# Patient Record
Sex: FEMALE | Race: WHITE | NOT HISPANIC OR LATINO | Employment: FULL TIME | URBAN - METROPOLITAN AREA
[De-identification: names, ages, dates, MRNs, and addresses within clinical notes are randomized per-mention and may not be internally consistent; named-entity substitution may affect disease eponyms.]

---

## 2018-12-28 ENCOUNTER — HOSPITAL ENCOUNTER (OUTPATIENT)
Dept: FAMILY MEDICINE CLINIC | Facility: CLINIC | Age: 23
Setting detail: SPECIMEN
Discharge: HOME | End: 2018-12-28
Attending: FAMILY MEDICINE | Admitting: FAMILY MEDICINE

## 2018-12-28 LAB — ERYTHROCYTE [SEDIMENTATION RATE] IN BLOOD BY WESTERGREN METHOD: 7 MM/HR (ref 0–20)

## 2018-12-31 LAB
CHROMATIN AB SERPL-ACNC: <20 [IU]/ML (ref 0–20)
EBV AB TO VIRAL CAPSID AG, IGM: NEGATIVE

## 2019-05-09 ENCOUNTER — HOSPITAL ENCOUNTER (OUTPATIENT)
Dept: FAMILY MEDICINE CLINIC | Facility: CLINIC | Age: 24
Setting detail: SPECIMEN
Discharge: HOME | End: 2019-05-09
Attending: FAMILY MEDICINE | Admitting: FAMILY MEDICINE

## 2019-05-09 LAB
BASOPHILS # BLD AUTO: 0 10*3/UL (ref 0–0.2)
BASOPHILS NFR BLD AUTO: 1 % (ref 0–2)
DIFFERENTIAL METHOD BLD: (no result)
EOSINOPHIL # BLD AUTO: 0.1 10*3/UL (ref 0–0.3)
EOSINOPHIL # BLD AUTO: 2 % (ref 0–3)
ERYTHROCYTE [DISTWIDTH] IN BLOOD BY AUTOMATED COUNT: 12.8 % (ref 11.5–14.5)
HCT VFR BLD AUTO: 41.9 % (ref 35–49)
HGB BLD-MCNC: 13.9 G/DL (ref 12–15)
LYMPHOCYTES # BLD AUTO: 2.4 10*3/UL (ref 0.8–4.8)
LYMPHOCYTES NFR BLD AUTO: 39 % (ref 18–42)
MCH RBC QN AUTO: 31.4 PG (ref 26–32)
MCHC RBC AUTO-ENTMCNC: 33.3 G/DL (ref 32–36)
MCV RBC AUTO: 94.5 FL (ref 80–94)
MONOCYTES # BLD AUTO: 0.4 10*3/UL (ref 0.1–1.3)
MONOCYTES NFR BLD AUTO: 7 % (ref 2–11)
NEUTROPHILS # BLD AUTO: 3.2 10*3/UL (ref 2.3–8.6)
NEUTROPHILS NFR BLD AUTO: 51 % (ref 50–75)
NRBC BLD AUTO-RTO: 0 /100{WBCS}
NRBC/RBC NFR BLD MANUAL: 0 10*3/UL
PLATELET # BLD AUTO: 256 10*3/UL (ref 150–450)
PMV BLD AUTO: 8.9 FL (ref 7.4–10.4)
RBC # BLD AUTO: 4.44 10*6/UL (ref 4–5.4)
TSH SERPL-ACNC: 1.34 UIU/ML (ref 0.34–5.6)
WBC # BLD AUTO: 6.2 10*3/UL (ref 4.5–11.5)

## 2019-05-10 LAB — 25(OH)D3 SERPL-MCNC: 18 NG/ML (ref 30–100)

## 2019-08-07 ENCOUNTER — OFFICE VISIT (OUTPATIENT)
Dept: FAMILY MEDICINE CLINIC | Facility: CLINIC | Age: 24
End: 2019-08-07

## 2019-08-07 VITALS
HEART RATE: 88 BPM | BODY MASS INDEX: 23.56 KG/M2 | OXYGEN SATURATION: 100 % | DIASTOLIC BLOOD PRESSURE: 77 MMHG | HEIGHT: 60 IN | WEIGHT: 120 LBS | SYSTOLIC BLOOD PRESSURE: 109 MMHG

## 2019-08-07 DIAGNOSIS — M25.562 ARTHRALGIA OF BOTH KNEES: Primary | ICD-10-CM

## 2019-08-07 DIAGNOSIS — M25.561 ARTHRALGIA OF BOTH KNEES: Primary | ICD-10-CM

## 2019-08-07 PROBLEM — R76.8 ELEVATED ANTINUCLEAR ANTIBODY (ANA) LEVEL: Status: ACTIVE | Noted: 2019-01-04

## 2019-08-07 PROBLEM — R51.9 HEADACHE: Status: ACTIVE | Noted: 2019-05-09

## 2019-08-07 PROBLEM — F32.A DEPRESSION: Status: ACTIVE | Noted: 2019-05-09

## 2019-08-07 PROBLEM — E55.9 VITAMIN D DEFICIENCY: Status: ACTIVE | Noted: 2019-05-10

## 2019-08-07 PROBLEM — R20.2 PARESTHESIAS: Status: ACTIVE | Noted: 2019-05-09

## 2019-08-07 PROBLEM — M25.50 PAIN IN JOINT: Status: ACTIVE | Noted: 2018-12-28

## 2019-08-07 PROCEDURE — 99213 OFFICE O/P EST LOW 20 MIN: CPT | Performed by: NURSE PRACTITIONER

## 2019-08-07 RX ORDER — BACLOFEN 10 MG/1
10 TABLET ORAL 3 TIMES DAILY
Qty: 30 TABLET | Refills: 0 | Status: SHIPPED | OUTPATIENT
Start: 2019-08-07 | End: 2022-05-25

## 2019-08-07 RX ORDER — NAPROXEN 500 MG/1
500 TABLET ORAL 2 TIMES DAILY WITH MEALS
Qty: 60 TABLET | Refills: 1 | Status: SHIPPED | OUTPATIENT
Start: 2019-08-07 | End: 2022-05-25

## 2019-08-07 RX ORDER — BUTALBITAL, ACETAMINOPHEN AND CAFFEINE 50; 325; 40 MG/1; MG/1; MG/1
CAPSULE ORAL
Refills: 0 | COMMUNITY
Start: 2019-06-01 | End: 2022-05-25

## 2019-08-07 RX ORDER — ERGOCALCIFEROL 1.25 MG/1
CAPSULE ORAL
COMMUNITY
Start: 2019-05-10 | End: 2022-05-25

## 2019-08-07 NOTE — PATIENT INSTRUCTIONS
Start taking naproxen twice daily  May take two extra strength tylenol up to 3 times a day (dont exceed 3,000mg)  Continue warm compress and showers  Call Rheumatologist to have them fax records  May take baclofen for muscle spasms

## 2019-08-07 NOTE — PROGRESS NOTES
Subjective   Arlen Roth is a 24 y.o. female.     Pt is here today with c/o knee pain and stiffness.  She states that she went to stand up and she could hardly put any pressure on her left leg.  She has had a high BEN screening and has seen a rheumatologist when she was living in [place].  She is trying to get established with one here.  She has consistently been having pain all over her body,  But it tends to be worsening.  She has tried taking some aleve and ibuprofen without relief. Relieving: soaking in hot bath with epsom salt.  Aggravating Factors: up walking and movement. She does still have some numbness and tingling in her hands and feet, but she is to see a neurologist next week.  Denies fever or chills.  Increased fatigue. She was on mobic, but didn't see any improvement so she stopped it,         The following portions of the patient's history were reviewed and updated as appropriate: allergies, current medications, past family history, past medical history, past social history, past surgical history and problem list.    Review of Systems   Constitutional: Positive for fatigue. Negative for appetite change, chills and fever.   Eyes: Positive for blurred vision (occasional). Negative for double vision, pain, discharge, itching and visual disturbance.   Respiratory: Positive for shortness of breath (occasional). Negative for cough, chest tightness and wheezing.    Cardiovascular: Negative for chest pain and palpitations.   Gastrointestinal: Negative for abdominal pain, blood in stool, constipation, diarrhea, nausea and vomiting.   Endocrine: Negative for polydipsia, polyphagia and polyuria.   Genitourinary: Negative for dysuria, flank pain, frequency and urgency.   Musculoskeletal: Positive for arthralgias, joint swelling and myalgias (knees).   Skin: Negative for rash and skin lesions.   Neurological: Positive for numbness and headache. Negative for dizziness and weakness. Light-headedness: stable at this  "time.   Psychiatric/Behavioral: Negative for stress. The patient is nervous/anxious.        Objective   /77 (BP Location: Right arm, Patient Position: Sitting, Cuff Size: Adult)   Pulse 88   Ht 152.4 cm (60\")   Wt 54.4 kg (120 lb)   SpO2 100%   BMI 23.44 kg/m²   Physical Exam   Constitutional: She is oriented to person, place, and time. She appears well-developed and well-nourished.   HENT:   Head: Normocephalic and atraumatic.   Eyes: EOM are normal. Pupils are equal, round, and reactive to light.   Cardiovascular: Normal rate and regular rhythm.   Pulmonary/Chest: Effort normal and breath sounds normal.   Abdominal: Soft. Bowel sounds are normal.   Musculoskeletal: She exhibits tenderness (tenderness around entire knee joint.  No crepitus or popping). She exhibits no edema or deformity.   Pain in knee associated with bending and straightening leg   Neurological: She is alert and oriented to person, place, and time.   Skin: Skin is warm and dry.   Psychiatric: She has a normal mood and affect. Her behavior is normal. Thought content normal.         Assessment/Plan   Problems Addressed this Visit        Nervous and Auditory    Pain in joint - Primary    Relevant Medications    naproxen (NAPROSYN) 500 MG tablet    baclofen (LIORESAL) 10 MG tablet              Diagnoses and all orders for this visit:    1. Arthralgia of both knees (Primary)  Comments:  pt waiting to see rheum- advised to have rheum in [place] fax their records  failed mobic  try naproxen and Extra strenth tylenol  baclofen as needed  Orders:  -     naproxen (NAPROSYN) 500 MG tablet; Take 1 tablet by mouth 2 (Two) Times a Day With Meals.  Dispense: 60 tablet; Refill: 1  -     baclofen (LIORESAL) 10 MG tablet; Take 1 tablet by mouth 3 (Three) Times a Day.  Dispense: 30 tablet; Refill: 0          "

## 2019-12-12 ENCOUNTER — TELEPHONE (OUTPATIENT)
Dept: FAMILY MEDICINE CLINIC | Facility: CLINIC | Age: 24
End: 2019-12-12

## 2019-12-12 RX ORDER — AZITHROMYCIN 250 MG/1
TABLET, FILM COATED ORAL
Qty: 6 TABLET | Refills: 0 | Status: SHIPPED | OUTPATIENT
Start: 2019-12-12 | End: 2022-05-25

## 2022-05-25 ENCOUNTER — LAB (OUTPATIENT)
Dept: FAMILY MEDICINE CLINIC | Facility: CLINIC | Age: 27
End: 2022-05-25

## 2022-05-25 ENCOUNTER — OFFICE VISIT (OUTPATIENT)
Dept: FAMILY MEDICINE CLINIC | Facility: CLINIC | Age: 27
End: 2022-05-25

## 2022-05-25 VITALS
DIASTOLIC BLOOD PRESSURE: 87 MMHG | HEART RATE: 89 BPM | HEIGHT: 60 IN | WEIGHT: 125 LBS | SYSTOLIC BLOOD PRESSURE: 124 MMHG | TEMPERATURE: 98.7 F | BODY MASS INDEX: 24.54 KG/M2 | OXYGEN SATURATION: 100 %

## 2022-05-25 DIAGNOSIS — N91.2 AMENORRHEA: ICD-10-CM

## 2022-05-25 DIAGNOSIS — N91.2 AMENORRHEA: Primary | ICD-10-CM

## 2022-05-25 LAB
ALBUMIN SERPL-MCNC: 4.4 G/DL (ref 3.5–5.2)
ALBUMIN/GLOB SERPL: 1.8 G/DL
ALP SERPL-CCNC: 61 U/L (ref 39–117)
ALT SERPL W P-5'-P-CCNC: 30 U/L (ref 1–33)
ANION GAP SERPL CALCULATED.3IONS-SCNC: 13.9 MMOL/L (ref 5–15)
AST SERPL-CCNC: 32 U/L (ref 1–32)
BASOPHILS # BLD AUTO: 0.08 10*3/MM3 (ref 0–0.2)
BASOPHILS NFR BLD AUTO: 1.1 % (ref 0–1.5)
BILIRUB SERPL-MCNC: 0.2 MG/DL (ref 0–1.2)
BUN SERPL-MCNC: 10 MG/DL (ref 6–20)
BUN/CREAT SERPL: 14.5 (ref 7–25)
CALCIUM SPEC-SCNC: 9.1 MG/DL (ref 8.6–10.5)
CHLORIDE SERPL-SCNC: 100 MMOL/L (ref 98–107)
CO2 SERPL-SCNC: 26.1 MMOL/L (ref 22–29)
CREAT SERPL-MCNC: 0.69 MG/DL (ref 0.57–1)
DEPRECATED RDW RBC AUTO: 39.8 FL (ref 37–54)
EGFRCR SERPLBLD CKD-EPI 2021: 122.9 ML/MIN/1.73
EOSINOPHIL # BLD AUTO: 0.32 10*3/MM3 (ref 0–0.4)
EOSINOPHIL NFR BLD AUTO: 4.4 % (ref 0.3–6.2)
ERYTHROCYTE [DISTWIDTH] IN BLOOD BY AUTOMATED COUNT: 12.1 % (ref 12.3–15.4)
FSH SERPL-ACNC: 4.63 MIU/ML
GLOBULIN UR ELPH-MCNC: 2.5 GM/DL
GLUCOSE SERPL-MCNC: 98 MG/DL (ref 65–99)
HCT VFR BLD AUTO: 39.1 % (ref 34–46.6)
HGB BLD-MCNC: 13.3 G/DL (ref 12–15.9)
IMM GRANULOCYTES # BLD AUTO: 0.01 10*3/MM3 (ref 0–0.05)
IMM GRANULOCYTES NFR BLD AUTO: 0.1 % (ref 0–0.5)
LH SERPL-ACNC: 30.7 MIU/ML
LYMPHOCYTES # BLD AUTO: 3.64 10*3/MM3 (ref 0.7–3.1)
LYMPHOCYTES NFR BLD AUTO: 50.5 % (ref 19.6–45.3)
MCH RBC QN AUTO: 30.9 PG (ref 26.6–33)
MCHC RBC AUTO-ENTMCNC: 34 G/DL (ref 31.5–35.7)
MCV RBC AUTO: 90.9 FL (ref 79–97)
MONOCYTES # BLD AUTO: 0.6 10*3/MM3 (ref 0.1–0.9)
MONOCYTES NFR BLD AUTO: 8.3 % (ref 5–12)
NEUTROPHILS NFR BLD AUTO: 2.56 10*3/MM3 (ref 1.7–7)
NEUTROPHILS NFR BLD AUTO: 35.6 % (ref 42.7–76)
NRBC BLD AUTO-RTO: 0 /100 WBC (ref 0–0.2)
PLATELET # BLD AUTO: 257 10*3/MM3 (ref 140–450)
PMV BLD AUTO: 10.9 FL (ref 6–12)
POTASSIUM SERPL-SCNC: 4.1 MMOL/L (ref 3.5–5.2)
PROT SERPL-MCNC: 6.9 G/DL (ref 6–8.5)
RBC # BLD AUTO: 4.3 10*6/MM3 (ref 3.77–5.28)
SODIUM SERPL-SCNC: 140 MMOL/L (ref 136–145)
TSH SERPL DL<=0.05 MIU/L-ACNC: 3.6 UIU/ML (ref 0.27–4.2)
WBC NRBC COR # BLD: 7.21 10*3/MM3 (ref 3.4–10.8)

## 2022-05-25 PROCEDURE — 83001 ASSAY OF GONADOTROPIN (FSH): CPT | Performed by: FAMILY MEDICINE

## 2022-05-25 PROCEDURE — 82672 ASSAY OF ESTROGEN: CPT | Performed by: FAMILY MEDICINE

## 2022-05-25 PROCEDURE — 83002 ASSAY OF GONADOTROPIN (LH): CPT | Performed by: FAMILY MEDICINE

## 2022-05-25 PROCEDURE — 36415 COLL VENOUS BLD VENIPUNCTURE: CPT

## 2022-05-25 PROCEDURE — 84702 CHORIONIC GONADOTROPIN TEST: CPT | Performed by: FAMILY MEDICINE

## 2022-05-25 PROCEDURE — 80050 GENERAL HEALTH PANEL: CPT | Performed by: FAMILY MEDICINE

## 2022-05-25 PROCEDURE — 99213 OFFICE O/P EST LOW 20 MIN: CPT | Performed by: FAMILY MEDICINE

## 2022-05-25 NOTE — PROGRESS NOTES
"Subjective   Arlen Roth is a 27 y.o. female.     History of Present Illness     Arlen Marin, a 27-year-old female comes in today with complaints of amenorrhea since 03/2022. She has done a pregnancy test, both urine and blood that were negative.    The patient reports she has not had a menstrual cycle since mid-03/2022. She states she is sexually active and uses condoms. The patient reports that within the last 2 weeks she had two negative pregnancy tests; a blood test and a urine test done at urgent care. She states she has a history of ovarian cysts. The patient reports she started on a birth control patch a couple of years ago, and stopped using it about 09/2022. She states that the patch caused her to gain weight, she felt irritated all the time, and caused skin break-outs.    The patient reports she started a new job in 03/2022, and cannot identify other stressors. She denies pelvic pain. She states she has gained 17 pounds recently. The patient denies changing her exercise routine.    The following portions of the patient's history were reviewed and updated as appropriate: allergies, current medications, past family history, past medical history, past social history, past surgical history, and problem list.  Past Medical History:   Diagnosis Date   • Headache      Past Surgical History:   Procedure Laterality Date   • ADENOIDECTOMY     • TONSILLECTOMY       Family History   Problem Relation Age of Onset   • Diabetes Mother    • Hypertension Mother    • Diabetes Father    • Hypertension Father    • Cancer Maternal Grandmother      Social History     Socioeconomic History   • Marital status: Single   Tobacco Use   • Smoking status: Never Smoker   • Smokeless tobacco: Never Used   Vaping Use   • Vaping Use: Never used   Substance and Sexual Activity   • Alcohol use: No   • Drug use: No   • Sexual activity: Never       No current outpatient medications on file.    Review of Systems  Ht 152.4 cm (60\")   Wt 56.7 kg " (125 lb)   BMI 24.41 kg/m²     Review of systems was performed, and pertinent findings are noted in the HPI.    Objective   Physical Exam  Vitals and nursing note reviewed.   Constitutional:       General: She is not in acute distress.     Appearance: Normal appearance. She is well-developed.      Comments: She is normal. Well groomed, cooperative exam.    HENT:      Head: Normocephalic and atraumatic.   Neck:      Thyroid: No thyromegaly.      Comments: Neck supple without adenopathy. Thyroid is normal to palpation.  Cardiovascular:      Rate and Rhythm: Normal rate and regular rhythm.      Heart sounds: Normal heart sounds. No murmur heard.    No friction rub. No gallop.      Comments: Heart has regular rate and rhythm without murmur.   Pulmonary:      Effort: Pulmonary effort is normal. No respiratory distress.      Breath sounds: Normal breath sounds. No wheezing or rales.      Comments: Lungs are clear to auscultation bilaterally.   Abdominal:      Comments: Abdomen is soft, nontender. Positive bowel sounds. No guarding, no rebound. No masses appreciated.   Musculoskeletal:      Cervical back: Neck supple.   Lymphadenopathy:      Cervical: No cervical adenopathy.   Skin:     General: Skin is warm and dry.      Comments: No abnormal skin rashes, bruising, jaundice.    Neurological:      Mental Status: She is alert.   Psychiatric:         Mood and Affect: Mood normal.           Assessment & Plan   Problems Addressed this Visit    None     Visit Diagnoses     Amenorrhea    -  Primary      Diagnoses       Codes Comments    Amenorrhea    -  Primary ICD-10-CM: N91.2  ICD-9-CM: 626.0         1. Amenorrhea  - We will check hormone levels and thyroid levels. I will go ahead and check a serum hCG, FSH, LH, total estrogen, CBC, CMP, TSH. At this point, I do not feel like she needs a pelvic ultrasound since she has not been having any pelvic pain. I have encouraged her to work on some stress reduction. There has been no  change in her exercise routine, so I have explained to her that this may be a normal occurrence and that her regular period will come along in the next month or so.  Her weight has not changed significantly in our office in the last year    Transcribed from ambient dictation for Alesha Fernandes MD by Deonna Barnhart.  05/25/22   09:24 EDT    Patient verbalized consent to the visit recording.

## 2022-05-26 LAB — HCG INTACT+B SERPL-ACNC: <1 MIU/ML

## 2022-05-28 LAB — ESTROGEN SERPL-MCNC: 123 PG/ML

## 2022-06-27 DIAGNOSIS — E22.1 HYPERPROLACTINEMIA: ICD-10-CM

## 2022-06-27 DIAGNOSIS — N91.2 AMENORRHEA: Primary | ICD-10-CM

## 2022-06-29 ENCOUNTER — LAB (OUTPATIENT)
Dept: FAMILY MEDICINE CLINIC | Facility: CLINIC | Age: 27
End: 2022-06-29

## 2022-06-29 DIAGNOSIS — N91.2 AMENORRHEA: ICD-10-CM

## 2022-06-29 DIAGNOSIS — E22.1 HYPERPROLACTINEMIA: ICD-10-CM

## 2022-06-29 LAB — PROLACTIN SERPL-MCNC: 24.2 NG/ML (ref 4.79–23.3)

## 2022-06-29 PROCEDURE — 36415 COLL VENOUS BLD VENIPUNCTURE: CPT

## 2022-06-29 PROCEDURE — 84146 ASSAY OF PROLACTIN: CPT | Performed by: FAMILY MEDICINE

## 2022-07-01 ENCOUNTER — TELEPHONE (OUTPATIENT)
Dept: FAMILY MEDICINE CLINIC | Facility: CLINIC | Age: 27
End: 2022-07-01

## 2022-07-01 NOTE — TELEPHONE ENCOUNTER
PATIENT IS CALLING IN SHE IS ASKING TO HAVE THE ORDERS OR REFERRAL FOR MRI SENT OVER TO [facility] IN [place] INSTEAD.      [address] [facility] [place] [address] [phone] IS THE NUMBER FOR THE FACILITY.   PLEASE ADVISE    CALLBACK NUMBER IS  [phone]

## 2022-07-06 ENCOUNTER — TELEPHONE (OUTPATIENT)
Dept: FAMILY MEDICINE CLINIC | Facility: CLINIC | Age: 27
End: 2022-07-06

## 2022-07-06 NOTE — TELEPHONE ENCOUNTER
PATIENT IS WANTING AN UPDATE ON THE REFERRAL FOR XRAY     CAN YOU CALL WITH MORE INFORMATION       Arlen Roth (Self) [phone] (H)

## 2022-07-07 NOTE — TELEPHONE ENCOUNTER
Spoke to pt and she is wanting the referral to be changed from [facility] to [facility] please, if possible.

## 2022-07-12 NOTE — TELEPHONE ENCOUNTER
PATIENT IS CALLING BACK IN ABOUT THIS MRI SHE HAS ASKED TO HAVE IT SENT TO [facility] IN [place] AND SHE STILL HAS APPT SET AT [place] AND SHE DOES NOT WANT TO HAVE IT DONE THERE.       SHE WOULD LIKE CALLBACK       CALLBACK NUMBER IS  [phone]

## 2022-07-13 NOTE — TELEPHONE ENCOUNTER
PATIENT WOULD LIKE AN UPDATE ON THIS REQUEST     THANK YOU   Arlen Roth (Self) [phone] (H)     (CALLER WAS NOT ABLE TO VERIFY SECURITY ON PATIENT, JUST HER NAME AND DATE OF BIRTH)

## 2022-07-23 ENCOUNTER — APPOINTMENT (OUTPATIENT)
Dept: MRI IMAGING | Facility: HOSPITAL | Age: 27
End: 2022-07-23

## 2022-07-29 ENCOUNTER — TELEPHONE (OUTPATIENT)
Dept: FAMILY MEDICINE CLINIC | Facility: CLINIC | Age: 27
End: 2022-07-29

## 2022-07-29 DIAGNOSIS — R90.89 ABNORMAL FINDING ON MRI OF BRAIN: Primary | ICD-10-CM

## 2022-07-29 RX ORDER — MEDROXYPROGESTERONE ACETATE 10 MG/1
10 TABLET ORAL DAILY
Qty: 10 TABLET | Refills: 0 | Status: SHIPPED | OUTPATIENT
Start: 2022-07-29 | End: 2022-08-08

## 2022-07-29 NOTE — TELEPHONE ENCOUNTER
----- Message from Yvonne Marie MA sent at 7/29/2022  8:53 AM EDT -----  Patient notified. No, she has not started her period yet.

## 2022-08-05 ENCOUNTER — TELEPHONE (OUTPATIENT)
Dept: FAMILY MEDICINE CLINIC | Facility: CLINIC | Age: 27
End: 2022-08-05

## 2022-08-05 DIAGNOSIS — R90.89 ABNORMAL FINDING ON MRI OF BRAIN: Primary | ICD-10-CM

## 2022-08-05 NOTE — TELEPHONE ENCOUNTER
----- Message from Anyi Benton sent at 8/4/2022  3:35 PM EDT -----  Regarding: FW: REFERRAL TO NEUROLOGY      ----- Message -----  From: Arlen Roth  Sent: 8/3/2022  11:51 AM EDT  To: Anyi Benton  Subject: REFERRAL TO NEUROLOGY                            Hello   Last time I was sent there he did not help me in anyway. He would just sit there and type on his computer and then say I’m fine, no exams no anything. Is there another place you all would recommend sending me to or should I find someone?     Many thanks,   Arlen